# Patient Record
(demographics unavailable — no encounter records)

---

## 2024-11-11 NOTE — BEGINNING OF VISIT
[Parents] : parents [] :  [Other: ______] : provided by [employer] [Time Spent: ____ minutes] : Total time spent using  services: [unfilled] minutes. The patient's primary language is not English thus required  services. [Interpreters_IDNumber] : [id number] [Interpreters_FullName] : rajeev [TWNoteComboBox1] : Com[place]an

## 2024-11-11 NOTE — DISCUSSION/SUMMARY
[Normal Growth] : growth [Normal Development] : developmental [No Elimination Concerns] : elimination [Continue Regimen] : feeding [No Skin Concerns] : skin [Normal Sleep Pattern] : sleep [None] : no known medical problems [Anticipatory Guidance Given] : Anticipatory guidance addressed as per the history of present illness section [ Transition] :  transition [ Care] :  care [Nutritional Adequacy] : nutritional adequacy [Parental Well-Being] : parental well-being [Safety] : safety [No Vaccines] : no vaccines needed [No Medications] : ~He/She~ is not on any medications [Parent/Guardian] : Parent/Guardian [FreeTextEntry1] : 18d ex-35.5wk with 1 episode of SVT on DOL 4 past birth weight, gained 9oz from discharge feeding well  Recommend exclusive breastfeeding, 8-12 feedings per day. Mother should continue prenatal vitamins and avoid alcohol. If formula is needed, recommend iron-fortified formulations every 2-3 hrs. When in car, patient should be in rear-facing car seat in back seat. Air dry umbilical stump. Put baby to sleep on back, in own crib with no loose or soft bedding. Limit baby's exposure to others, especially those with fever or unknown vaccine status. rto for 1mo wcc or sooner prn

## 2024-11-11 NOTE — PHYSICAL EXAM
[Alert] : alert [Normocephalic] : normocephalic [Flat Open Anterior [place]] : flat open anterior fontanelle [PERRL] : PERRL [Red Reflex Bilateral] : red reflex bilateral [Normally Placed Ears] : normally placed ears [Auricles Well Formed] : auricles well formed [Clear Tympanic membranes] : clear tympanic membranes [Light reflex present] : light reflex present [Bony structures visible] : bony structures visible [Patent Auditory Canal] : patent auditory canal [Nares Patent] : nares patent [Palate Intact] : palate intact [Uvula Midline] : uvula midline [Supple, full passive range of motion] : supple, full passive range of motion [Symmetric Chest Rise] : symmetric chest rise [Clear to Auscultation Bilaterally] : clear to auscultation bilaterally [Regular Rate and Rhythm] : regular rate and rhythm [S1, S2 present] : S1, S2 present [+2 Femoral Pulses] : +2 femoral pulses [Soft] : soft [Bowel Sounds] : bowel sounds present [Umbilical Stump Dry, Clean, Intact] : umbilical stump dry, clean, intact [Normal external genitailia] : normal external genitalia [Central Urethral Opening] : central urethral opening [Testicles Descended Bilaterally] : testicles descended bilaterally [Patent] : patent [Normally Placed] : normally placed [No Abnormal Lymph Nodes Palpated] : no abnormal lymph nodes palpated [Symmetric Flexed Extremities] : symmetric flexed extremities [Startle Reflex] : startle reflex present [Suck Reflex] : suck reflex present [Rooting] : rooting reflex present [Palmar Grasp] : palmar grasp present [Plantar Grasp] : plantar reflex present [Symmetric Phillip] : symmetric [place] [Acute Distress] : no acute distress [Icteric sclera] : nonicteric sclera [Discharge] : no discharge [Palpable Masses] : no palpable masses [Murmurs] : no murmurs [Tender] : nontender [Distended] : not distended [Hepatomegaly] : no hepatomegaly [Splenomegaly] : no splenomegaly [Mace-Ortolani] : negative Mace-Ortolani [Spinal Dimple] : no spinal dimple [Tuft of Hair] : no tuft of hair [Jaundice] : not jaundice

## 2024-11-11 NOTE — HISTORY OF PRESENT ILLNESS
[Born at ___ Wks Gestation] : The patient was born at [unfilled] weeks gestation [C/S] : via  section [C/S Indication: ____] : ( [unfilled] ) [Age: ___] : [unfilled] year old mother [G: ___] : G [unfilled] [P: ___] : P [unfilled] [GBS] : GBS positive [Rubella (Immune)] : Rubella immune [(1) _____] : [unfilled] [(5) _____] : [unfilled] [BW: _____] : weight of [unfilled] [DW: _____] : Discharge weight was [unfilled] [HepBsAG] : HepBsAg negative [HIV] : HIV negative [VDRL/RPR (Reactive)] : VDRL/RPR nonreactive [FreeTextEntry1] : 35.2wk repeat C/S at [facility] on CPAP in NICU at [facility] and weaned off DOL 2 S/p amp and gent x 48 hours with blood culture no growth to date.  DOL 4 episode of tachycardia - SVT on EKG which broke without intervention. No further episodes noted. transferred to [facility] normal EKG, no pre-excitation. Has a structurally normal heart by echocardiogram. - f/u cardio 2 weeks after discharge   parents and 1yo sister at home healthy   taking formula (gentlease) 2-2.5oz every 3 hours and a little breastmilk  lots of urine/BM sleeping in bassinet  taking multivitamin   received beyfortus in NICU

## 2024-11-25 NOTE — HISTORY OF PRESENT ILLNESS
[FreeTextEntry1] : 1mo here for well visit hx 35wker, SVT as , s/p NICU stay and cardio eval, has appt with cardio next week  mom concerned with baby acne on face   feeding well - 3-3.5oz every 2-3 hours  normal urine/BM sleeping in bassinet  + tummy time   received hep B at [facility], beyfortus at [facility] NICU

## 2024-11-25 NOTE — PHYSICAL EXAM
[Alert] : alert [Acute Distress] : no acute distress [Normocephalic] : normocephalic [Flat Open Anterior [place]] : flat open anterior fontanelle [PERRL] : PERRL [Red Reflex Bilateral] : red reflex bilateral [Normally Placed Ears] : normally placed ears [Auricles Well Formed] : auricles well formed [Clear Tympanic membranes] : clear tympanic membranes [Light reflex present] : light reflex present [Bony landmarks visible] : bony landmarks visible [Discharge] : no discharge [Nares Patent] : nares patent [Palate Intact] : palate intact [Uvula Midline] : uvula midline [Supple, full passive range of motion] : supple, full passive range of motion [Palpable Masses] : no palpable masses [Symmetric Chest Rise] : symmetric chest rise [Clear to Auscultation Bilaterally] : clear to auscultation bilaterally [Regular Rate and Rhythm] : regular rate and rhythm [S1, S2 present] : S1, S2 present [Murmurs] : no murmurs [+2 Femoral Pulses] : +2 femoral pulses [Soft] : soft [Tender] : nontender [Distended] : not distended [Bowel Sounds] : bowel sounds present [Hepatomegaly] : no hepatomegaly [Splenomegaly] : no splenomegaly [Normal external genitailia] : normal external genitalia [Central Urethral Opening] : central urethral opening [Testicles Descended Bilaterally] : testicles descended bilaterally [Normally Placed] : normally placed [No Abnormal Lymph Nodes Palpated] : no abnormal lymph nodes palpated [Mace-Ortolani] : negative Mace-Ortolani [Symmetric Flexed Extremities] : symmetric flexed extremities [Spinal Dimple] : no spinal dimple [Tuft of Hair] : no tuft of hair [Startle Reflex] : startle reflex present [Suck Reflex] : suck reflex present [Rooting] : rooting reflex present [Palmar Grasp] : palmar grasp reflex present [Plantar Grasp] : plantar grasp reflex present [Symmetric Phillip] : symmetric [place] [Jaundice] : no jaundice [de-identified] : papular rash on face

## 2024-11-25 NOTE — BEGINNING OF VISIT
[Parents] : parents [] :  [Other: ______] : provided by [employer] [Time Spent: ____ minutes] : Total time spent using  services: [unfilled] minutes. The patient's primary language is not English thus required  services. [Interpreters_IDNumber] : [id number] [TWNoteComboBox1] : Burmese

## 2024-11-25 NOTE — DISCUSSION/SUMMARY
[Normal Growth] : growth [Normal Development] : development  [No Elimination Concerns] : elimination [Continue Regimen] : feeding [No Skin Concerns] : skin [Normal Sleep Pattern] : sleep [ Infant] :  infant [None] : no medical problems [Anticipatory Guidance Given] : Anticipatory guidance addressed as per the history of present illness section [Parental Well-Being] : parental well-being [Family Adjustment] : family adjustment [Feeding Routines] : feeding routines [Infant Adjustment] : infant adjustment [Safety] : safety [No Medications] : ~He/She~ is not on any medications [Parent/Guardian] : Parent/Guardian [] : The components of the vaccine(s) to be administered today are listed in the plan of care. The disease(s) for which the vaccine(s) are intended to prevent and the risks have been discussed with the caretaker.  The risks are also included in the appropriate vaccination information statements which have been provided to the patient's caregiver.  The caregiver has given consent to vaccinate. [FreeTextEntry1] : 1 month M here for well visit. No acute concerns for growth or development.  - hep B #2 given today - [place] N[place] negative - ketoconazole for seb derm rash - discussed nutrition, elimination, safe sleep, car safety, avoiding illness - vit D - RTO for 2mo wcc or sooner prn

## 2024-12-30 NOTE — PHYSICAL EXAM
[Alert] : alert [Acute Distress] : no acute distress [Normocephalic] : normocephalic [Flat Open Anterior [place]] : flat open anterior fontanelle [PERRL] : PERRL [Red Reflex Bilateral] : red reflex bilateral [Normally Placed Ears] : normally placed ears [Auricles Well Formed] : auricles well formed [Clear Tympanic membranes] : clear tympanic membranes [Light reflex present] : light reflex present [Bony landmarks visible] : bony landmarks visible [Discharge] : no discharge [Nares Patent] : nares patent [Palate Intact] : palate intact [Uvula Midline] : uvula midline [Supple, full passive range of motion] : supple, full passive range of motion [Palpable Masses] : no palpable masses [Symmetric Chest Rise] : symmetric chest rise [Clear to Auscultation Bilaterally] : clear to auscultation bilaterally [Regular Rate and Rhythm] : regular rate and rhythm [S1, S2 present] : S1, S2 present [Murmurs] : no murmurs [+2 Femoral Pulses] : +2 femoral pulses [Soft] : soft [Tender] : nontender [Distended] : not distended [Bowel Sounds] : bowel sounds present [Hepatomegaly] : no hepatomegaly [Splenomegaly] : no splenomegaly [Normal external genitailia] : normal external genitalia [Central Urethral Opening] : central urethral opening [Testicles Descended Bilaterally] : testicles descended bilaterally [Normally Placed] : normally placed [No Abnormal Lymph Nodes Palpated] : no abnormal lymph nodes palpated [Mace-Ortolani] : negative Mace-Ortolani [Symmetric Flexed Extremities] : symmetric flexed extremities [Spinal Dimple] : no spinal dimple [Tuft of Hair] : no tuft of hair [Startle Reflex] : startle reflex present [Suck Reflex] : suck reflex present [Rooting] : rooting reflex present [Palmar Grasp] : palmar grasp reflex present [Plantar Grasp] : plantar grasp reflex present [Symmetric Phillip] : symmetric [place] [Rash and/or lesion present] : no rash/lesion [FreeTextEntry9] : reducible umbilical hernia [de-identified] : dry skin over ears

## 2024-12-30 NOTE — DISCUSSION/SUMMARY
[Normal Growth] : growth [Normal Development] : development  [No Elimination Concerns] : elimination [Continue Regimen] : feeding [No Skin Concerns] : skin [Normal Sleep Pattern] : sleep [ Infant] :  infant [None] : no medical problems [Anticipatory Guidance Given] : Anticipatory guidance addressed as per the history of present illness section [Parental (Maternal) Well-Being] : parental (maternal) well-being [Infant-Family Synchrony] : infant-family synchrony [Nutritional Adequacy] : nutritional adequacy [Infant Behavior] : infant behavior [Safety] : safety [Age Approp Vaccines] : Age appropriate vaccines administered [No Medications] : ~He/She~ is not on any medications [Parent/Guardian] : Parent/Guardian [] : The components of the vaccine(s) to be administered today are listed in the plan of care. The disease(s) for which the vaccine(s) are intended to prevent and the risks have been discussed with the caretaker.  The risks are also included in the appropriate vaccination information statements which have been provided to the patient's caregiver.  The caregiver has given consent to vaccinate. [FreeTextEntry1] : 2 month M here for well visit. No acute concerns for growth or development.  - pentacel #1, prevnar #1, rota #1 given today  - f/u cardio - discussed umbilical hernia and red flag signs - moisturize dry skin - discussed nutrition, elimination, car safety, safe sleep, milestones - vit d drops - RTO for 4mo wcc or sooner prn

## 2024-12-30 NOTE — BEGINNING OF VISIT
[Mother] : mother [] :  [Language Line ] : provided by Language Line   [Interpreters_IDNumber] : [id number] [Interpreters_FullName] : krish

## 2024-12-30 NOTE — HISTORY OF PRESENT ILLNESS
[FreeTextEntry1] : 2mo here for well visit - hx 35wker with 10 day NICU stay, was on CPAP and had SVT, not discharged on any medications - earlier this month readmitted to hospital with concern for SVT episode - turned red and was foaming from mouth, couldn't breathe. initially at [facility] and transferred to [facility] with normal tele monitoring there, sent home with monitor, has f/u next week  having green stools  also with umbilical hernia  taking 6-8oz gentlease every 3 hours  normal urine/BM sleeping some longer stretches  tummy time smiling, cooing

## 2025-01-06 NOTE — REASON FOR VISIT
[Initial Consultation] : an initial consultation for [Parents] : parents [Interpreters_IDNumber] : [id number] [Interpreters_FullName] : aCrolyne  [TWNoteComboBox1] : [place]

## 2025-01-06 NOTE — CONSULT LETTER
[Today's Date] : [unfilled] [Name] : Name: [unfilled] [] : : ~~ [Today's Date:] : [unfilled] [Dear  ___:] : Dear Dr. [unfilled]: [Consult] : I had the pleasure of evaluating your patient, [unfilled]. My full evaluation follows. [Consult - Single Provider] : Thank you very much for allowing me to participate in the care of this patient. If you have any questions, please do not hesitate to contact me. [Sincerely,] : Sincerely, [FreeTextEntry4] : Dr Claudia Mccabe DO. Claudia Mccabe, DO Claudia Mccabe, DO [FreeTextEntry5] : [address], [place] [address] [FreeTextEntry6] : e: [phone]

## 2025-01-06 NOTE — REVIEW OF SYSTEMS
[___ Formula] : [unfilled] Formula  [___ ounces/feeding] : ~HOOD wang/feeding [___ Times/day] : [unfilled] times/day

## 2025-03-03 NOTE — CARDIOLOGY SUMMARY
[Today's Date] : [unfilled] [FreeTextEntry1] : Normal sinus rhythm at 134 bpm.  Normal axes, intervals and voltages for age. Normal T wave morphology. No ventricular pre-excitation. [FreeTextEntry2] : Summary: 1. Follow up study to assess function. 2.  {S,D,S   } Situs solitus, D-ventricular looping, normally related great arteries. 3. Normal right ventricular morphology with qualitatively normal size and systolic function. 4. Normal left ventricular size, morphology and systolic function. 5. No pericardial effusion. [de-identified] : During the 24 hour Holter (1d7m analysis time) the predominant rhythm was sinus rhythm at an avg rate of 135 (range  bpm). Normal circadian variability was present. No AV block or pauses >2 seconds were present. There were 3 PACs present, no runs of AT or SVT there were rare, isolated, monomorphic PVCs present (73 total, 3/hr). They were not present at peak heart rates, no couplets and no runs of VT were present. No symptomatic events were present.  Summary: Normal 24 hour Holter. [de-identified] : 12/2/24-12/31/24: Multiple tracings showing sinus rhythm and sinus tachycardia with and without isolated PVCs.

## 2025-03-03 NOTE — DISCUSSION/SUMMARY
[FreeTextEntry1] : Lowell is a 3 month old who presents to [facility] for follow-up of a BRUE and possible history of SVT. He was accompanied by his parents.  The etiology of his symptoms seem most consistent with reflux. There is fortunately no evidence of recurrent SVT. I reinforced heart rate teaching with Lowell's parents and they are comfortable checking his heart rates at home. For now we will continue to monitor for arrhythmia off of medications. I will plan on seeing him back in March before I go on leave to check in and review the results of his testing.

## 2025-03-03 NOTE — HISTORY OF PRESENT ILLNESS
[FreeTextEntry1] : Lowell is a 3 month old who presents to [facility] for follow-up of a BRUE and possible history of SVT. He was accompanied by his parents.  He was born at 35 weeks by , he required a brief stay in the NICU for respiratory support. He was thought to have a history of an episode of SVT neonatally (artifact, unclear if it was truly SVT or ST, resolved spontaneously). He was discharged on no medications after heart rate teaching. He was doing well until Dec 1st when his mother noted that he had an episode of turning bright red and was foaming at the mouth while crying. The episode lasted for 10 seconds and he appeared to not take a breath during that 10 second period. He returned immediately to baseline when episode ended, the ending was spontaneous. They have been checking his heart rates at home and were getting ratees no higher than 150 while calm. After the event they did check his heart rate, they didn't count it although they think they could have but did think it was faster than usual. He was otherwise well and did not have any other symptoms, specifically no fever, cough, or congestion.   He does have reflux and spits up often. He takes his feeds well with no tachypnea, sweating, or tiring during feeds. After the event he was brought to the [facility] ED where he was well appearing, ECG was normal. Given his history, however, he was transferred to [facility] [place]. He was monitored for at least 24 hours, telemetry showed normal sinus rhythm with occasional brief sinus bradycardia associated with spitting up. He was ultimately discharged home with an MCOT and cardiology follow-up. There is no significant family history, specifically no history of congenital heart disease, heart transplant or cardiomyopathy, known arrhythmias, ICDs or pacemakers, sudden cardiac death, early MI or stroke, SIDS, frequent miscarriages, death by drowning or single person car accident.

## 2025-03-03 NOTE — CONSULT LETTER
[Today's Date] : [unfilled] [Dear  ___:] : Dear Dr. [unfilled]: [Consult - Single Provider] : Thank you very much for allowing me to participate in the care of this patient. If you have any questions, please do not hesitate to contact me. [Sincerely,] : Sincerely, [Name] : Name: [unfilled] [] : : ~~ [Today's Date:] : [unfilled] [Consult] : I had the pleasure of evaluating your patient, [unfilled]. My full evaluation follows. [FreeTextEntry4] : Dr Claudia Mccabe DO. Claudia Mccabe, DO Claudia Mccabe, DO [FreeTextEntry5] : [address], [place] [address] [FreeTextEntry6] : e: [phone]

## 2025-03-03 NOTE — REASON FOR VISIT
[Parents] : parents [Initial Consultation] : an initial consultation for [FreeTextEntry3] : F/U SVT [Interpreters_IDNumber] : [id number] [Interpreters_FullName] : Carolyne  [TWNoteComboBox1] : Ethiopian

## 2025-03-03 NOTE — REVIEW OF SYSTEMS
[Vomiting] : vomiting [Nl] : no feeding issues at this time. [___ Formula] : [unfilled] Formula  [___ ounces/feeding] : ~HOOD wang/feeding [___ Times/day] : [unfilled] times/day [Acting Fussy] : not acting ~L fussy [Fever] : no fever [Wgt Loss (___ Lbs)] : no recent weight loss [Pallor] : not pale [Discharge] : no discharge [Redness] : no redness [Nasal Discharge] : no nasal discharge [Nasal Stuffiness] : no nasal congestion [Stridor] : no stridor [Cyanosis] : no cyanosis [Edema] : no edema [Diaphoresis] : not diaphoretic [Tachypnea] : not tachypneic [Wheezing] : no wheezing [Cough] : no cough [Being A Poor Eater] : not a poor eater [Diarrhea] : no diarrhea [Decrease In Appetite] : appetite not decreased [Fainting (Syncope)] : no fainting [Dec Consciousness] :  no decrease in consciousness [Seizure] : no seizures [Hypotonicity (Flaccid)] : not hypotonic [Refusal to Bear Wgt] : normal weight bearing [Puffy Hands/Feet] : no hand/feet puffiness [Rash] : no rash [Hemangioma] : no hemangioma [Jaundice] : no jaundice [Wound problems] : no wound problems [Bruising] : no tendency for easy bruising [Swollen Glands] : no lymphadenopathy [Enlarged [place]] : the fontanelle was not enlarged [Hoarse Cry] : no hoarse cry [Failure To Thrive] : no failure to thrive [Penis Circumcised] : not circumcised [Undescended Testes] : no undescended testicle [Ambiguous Genitals] : genitals not ambiguous [Dec Urine Output] : no oliguria

## 2025-03-03 NOTE — PLAN
[TextEntry] : #1 Possible SVT - If you think your child is in SVT, check their heart rate. If >180 while at rest, this is concerning for SVT. Try a vagal maneuver (bearing down, ice to face, assisted headstand). If your child remains in SVT for >30 minutes or if they look unwell at any time, call 911 or bring them to the nearest emergency room. - Hold off on medications for now   #2 PVCs  - Rare, isolated, monomorphic PVCs  - Repeat Holter at 6 months

## 2025-03-12 NOTE — BEGINNING OF VISIT
[Mother] : mother [] :  [Language Line ] : provided by Language Line   [Time Spent: ____ minutes] : Total time spent using  services: [unfilled] minutes. The patient's primary language is not English thus required  services. [Interpreters_IDNumber] : [id number] [TWNoteComboBox1] : Cuban

## 2025-03-12 NOTE — DISCUSSION/SUMMARY
[Normal Growth] : growth [Normal Development] : development  [No Elimination Concerns] : elimination [Continue Regimen] : feeding [Normal Sleep Pattern] : sleep [None] : no medical problems [Anticipatory Guidance Given] : Anticipatory guidance addressed as per the history of present illness section [Family Functioning] : family functioning [Nutritional Adequacy and Growth] : nutritional adequacy and growth [Infant Development] : infant development [Oral Health] : oral health [Safety] : safety [Age Approp Vaccines] : Age appropriate vaccines administered [No Medications] : ~He/She~ is not on any medications [Parent/Guardian] : Parent/Guardian [] : The components of the vaccine(s) to be administered today are listed in the plan of care. The disease(s) for which the vaccine(s) are intended to prevent and the risks have been discussed with the caretaker.  The risks are also included in the appropriate vaccination information statements which have been provided to the patient's caregiver.  The caregiver has given consent to vaccinate. [FreeTextEntry1] : 4 month M here for well visit growing well very dry skin - discussed skin care use hypoallergenic/fragrance free products, vaseline 2-3x a day, short lukewarm baths calmoseptine given for diaper rash, FOBT neg, air exposure, frequent diaper changes can try smaller, more frequent feeds, keep upright for 20-30mins after feeds  - pentacel #2, prevnar #2, rota #2 given today  - discussed nutrition, elimination, safe sleep, car safety - never leave baby unattended on elevated surface will bring back in 2 weeks to reassess skin and discuss solid food introduction

## 2025-03-12 NOTE — REVIEW OF SYSTEMS
[Spitting Up] : spitting up [Rash] : rash [Dry Skin] : dry skin [Itching] : itching [Negative] : Genitourinary

## 2025-03-12 NOTE — PHYSICAL EXAM
[Alert] : alert [Acute Distress] : no acute distress [Normocephalic] : normocephalic [Flat Open Anterior [place]] : flat open anterior fontanelle [Red Reflex] : red reflex bilateral [PERRL] : PERRL [Normally Placed Ears] : normally placed ears [Auricles Well Formed] : auricles well formed [Clear Tympanic membranes] : clear tympanic membranes [Light reflex present] : light reflex present [Bony landmarks visible] : bony landmarks visible [Discharge] : no discharge [Nares Patent] : nares patent [Palate Intact] : palate intact [Uvula Midline] : uvula midline [Palpable Masses] : no palpable masses [Symmetric Chest Rise] : symmetric chest rise [Clear to Auscultation Bilaterally] : clear to auscultation bilaterally [Regular Rate and Rhythm] : regular rate and rhythm [S1, S2 present] : S1, S2 present [Murmurs] : no murmurs [+2 Femoral Pulses] : (+) 2 femoral pulses [Soft] : soft [Tender] : nontender [Distended] : nondistended [Bowel Sounds] : bowel sounds present [Hepatomegaly] : no hepatomegaly [Splenomegaly] : no splenomegaly [Central Urethral Opening] : central urethral opening [Testicles Descended] : testicles descended bilaterally [Patent] : patent [Normally Placed] : normally placed [No Abnormal Lymph Nodes Palpated] : no abnormal lymph nodes palpated [Mace-Ortolani] : negative Mace-Ortolani [Allis Sign] : negative Allis sign [Spinal Dimple] : no spinal dimple [Tuft of Hair] : no tuft of hair [Startle Reflex] : startle reflex present [Plantar Grasp] : plantar grasp reflex present [Symmetric Phillip] : symmetric phillip [de-identified] : erythematous diaper rash with raw skin and bleeding. dry skin all over body with hypopigmentation, scattered eczematous patches

## 2025-03-12 NOTE — HISTORY OF PRESENT ILLNESS
[FreeTextEntry1] : 4mo here for well visit hx  SVT followed by cardiology  has has a rash/dry skin all over body for about 1.5-2 months, itchy not getting any better using aquaphor, using "delicate/sensitive skin" shampoo tide baby laundry detergent  bathes every other day   gentlease 6-8oz every 4 hours  spits up a lot 4-5 BM/day, lots of wet diapers bad diaper rash right now started to roll over putting hands in mouth smiling, laughing

## 2025-03-28 NOTE — DISCUSSION/SUMMARY
[FreeTextEntry1] : 5mo with dry skin/eczema, diaper rash vaseline TID, discussed skin care, if optimizing skin hydration will start steroid ointment air exposure to diaper rash, did FOBT 2 weeks ago and was neg  rto for 6mo wcc or sooner prn

## 2025-03-28 NOTE — PHYSICAL EXAM
[NL] : normotonic [de-identified] : dry skin, eczematous patches scattered all over. ulcerated diaper rash, bleeding

## 2025-03-28 NOTE — HISTORY OF PRESENT ILLNESS
[FreeTextEntry6] : at 4mo wcc complained of rash/dry skin  rash overall much better  using cetaphil bodywash/lotion  free&clear laundry detergent also with diaper rash, worsened after calmoseptine using A&D

## 2025-05-05 NOTE — BEGINNING OF VISIT
[] :  [Language Line ] : provided by [employer] Line   [Mother] : mother [Time Spent: ____ minutes] : Total time spent using  services: [unfilled] minutes. The patient's primary language is not English thus required  services. [Interpreters_IDNumber] : [id number] [TWNoteComboBox1] : [place]

## 2025-05-05 NOTE — PHYSICAL EXAM
[Alert] : alert [Acute Distress] : no acute distress [Normocephalic] : normocephalic [Flat Open Anterior [place]] : flat open anterior fontanelle [Red Reflex] : red reflex bilateral [PERRL] : PERRL [Normally Placed Ears] : normally placed ears [Auricles Well Formed] : auricles well formed [Clear Tympanic membranes] : clear tympanic membranes [Light reflex present] : light reflex present [Bony landmarks visible] : bony landmarks visible [Discharge] : no discharge [Nares Patent] : nares patent [Palate Intact] : palate intact [Uvula Midline] : uvula midline [Tooth Eruption] : no tooth eruption [Supple, full passive range of motion] : supple, full passive range of motion [Palpable Masses] : no palpable masses [Symmetric Chest Rise] : symmetric chest rise [Clear to Auscultation Bilaterally] : clear to auscultation bilaterally [Regular Rate and Rhythm] : regular rate and rhythm [S1, S2 present] : S1, S2 present [Murmurs] : no murmurs [+2 Femoral Pulses] : (+) 2 femoral pulses [Soft] : soft [Tender] : nontender [Distended] : nondistended [Bowel Sounds] : bowel sounds present [Hepatomegaly] : no hepatomegaly [Splenomegaly] : no splenomegaly [Central Urethral Opening] : central urethral opening [Testicles Descended] : testicles descended bilaterally [Patent] : patent [Normally Placed] : normally placed [No Abnormal Lymph Nodes Palpated] : no abnormal lymph nodes palpated [Mace-Ortolani] : negative Mace-Ortolani [Allis Sign] : negative Allis sign [Symmetric Buttocks Creases] : symmetric buttocks creases [Spinal Dimple] : no spinal dimple [Tuft of Hair] : no tuft of hair [Plantar Grasp] : plantar grasp reflex present [Cranial Nerves Grossly Intact] : cranial nerves grossly intact [de-identified] : low tone  [de-identified] : very dry skin, eczematous patches some excoriations

## 2025-05-05 NOTE — HISTORY OF PRESENT ILLNESS
[FreeTextEntry1] : 6mo here for well visit hx  SVT - follows with cardio, holter  not concerning  hx eczema  feeding well, ~24-30oz formula a day  started baby food, doing well  normal urine/BM sleeping well  using cerave and natural/herbal ointment - skin looks better per mom  sits with support rolling over reaching, grasping

## 2025-05-05 NOTE — DISCUSSION/SUMMARY
[Normal Growth] : growth [Normal Development] : development [None] : No medical problems [No Elimination Concerns] : elimination [No Feeding Concerns] : feeding [No Skin Concerns] : skin [Normal Sleep Pattern] : sleep [Family Functioning] : family functioning [Nutrition and Feeding] : nutrition and feeding [Infant Development] : infant development [Oral Health] : oral health [Safety] : safety [No Medications] : ~He/She~ is not on any medications [Parent/Guardian] : parent/guardian [] : The components of the vaccine(s) to be administered today are listed in the plan of care. The disease(s) for which the vaccine(s) are intended to prevent and the risks have been discussed with the caretaker.  The risks are also included in the appropriate vaccination information statements which have been provided to the patient's caregiver.  The caregiver has given consent to vaccinate. [FreeTextEntry1] : 6 month M here for well visit growing well, slightly low tone - slipping through my hands and sitting only with support - born at 35 weeks will reassess, encourage tummy time and sitting with support eczema better but still very dry and irritated skin, scratching throughout appt. will send steroid ointment and reassess - pentacel #3, prevnar #3, rota #3 given today  - discussed solid foods, adding water / sippy cup, brushing teeth, milestones, sleep - RTO for 9mo wcc or sooner prn

## 2025-07-28 NOTE — PHYSICAL EXAM
[Alert] : alert [Acute Distress] : no acute distress [Normocephalic] : normocephalic [Flat Open Anterior [place]] : flat open anterior fontanelle [Red Reflex] : red reflex bilateral [Excessive Tearing] : no excessive tearing [PERRL] : PERRL [Normally Placed Ears] : normally placed ears [Auricles Well Formed] : auricles well formed [Clear Tympanic membranes] : clear tympanic membranes [Light reflex present] : light reflex present [Bony landmarks visible] : bony landmarks visible [Discharge] : no discharge [Nares Patent] : nares patent [Palate Intact] : palate intact [Uvula Midline] : uvula midline [Supple, full passive range of motion] : supple, full passive range of motion [Palpable Masses] : no palpable masses [Symmetric Chest Rise] : symmetric chest rise [Clear to Auscultation Bilaterally] : clear to auscultation bilaterally [Regular Rate and Rhythm] : regular rate and rhythm [S1, S2 present] : S1, S2 present [Murmurs] : no murmurs [+2 Femoral Pulses] : (+) 2 femoral pulses [Soft] : soft [Tender] : nontender [Distended] : nondistended [Bowel Sounds] : bowel sounds present [Hepatomegaly] : no hepatomegaly [Splenomegaly] : no splenomegaly [Central Urethral Opening] : central urethral opening [Testicles Descended] : testicles descended bilaterally [No Abnormal Lymph Nodes Palpated] : no abnormal lymph nodes palpated [Symmetric Abduction and Rotation of hips] : symmetric abduction and rotation of hips [Allis Sign] : negative Allis sign [Straight] : straight [Cranial Nerves Grossly Intact] : cranial nerves grossly intact [de-identified] : scattered eczematous patches on arms, trunk, face. b/l legs erythematous and very dry, cracked skin, eczematous patches

## 2025-07-28 NOTE — HISTORY OF PRESENT ILLNESS
[FreeTextEntry1] : 9mo here for well visit hx eczema - flaring again for past 3 days  changed laundry detergent - unscented  steroid ointment not working anymore foot bad now and not healing   eating well normal urine/BM sleeps through the night brushing teeth, PVF  pulls to stand crawling says elaine stephenson grasp

## 2025-07-28 NOTE — BEGINNING OF VISIT
[] :  [Language Line ] : provided by Language Line   [Time Spent: ____ minutes] : Total time spent using  services: [unfilled] minutes. The patient's primary language is not English thus required  services. [Interpreters_IDNumber] : [id number] [TWNoteComboBox1] : [place]ian

## 2025-07-28 NOTE — DISCUSSION/SUMMARY
[Normal Growth] : growth [Normal Development] : development [None] : No known medical problems [No Elimination Concerns] : elimination [No Feeding Concerns] : feeding [Normal Sleep Pattern] : sleep [Family Adaptation] : family adaptation [Infant [place]] : infant independence [Feeding Routine] : feeding routine [Safety] : safety [No Medications] : ~He/She~ is not on any medications [Parent/Guardian] : parent/guardian [] : The components of the vaccine(s) to be administered today are listed in the plan of care. The disease(s) for which the vaccine(s) are intended to prevent and the risks have been discussed with the caretaker.  The risks are also included in the appropriate vaccination information statements which have been provided to the patient's caregiver.  The caregiver has given consent to vaccinate. [FreeTextEntry1] : 9 month M here for well visit. No acute concerns for growth or development. - hep B given - derm for eczema - discussed again cerave/cetaphil, keeping skin moisturized with fragrance free lotion, unscented detergent - sent higher potency steroid ointment for body - Continue breastmilk or formula as desired. Increase table foods, 3 meals with 2-3 snacks per day. Incorporate up to 6 oz of fluorinated water daily in a sippy cup. Discussed weaning of bottle and pacifier. Wipe teeth daily with washcloth. When in car, patient should be in rear-facing car seat in back seat. Put baby to sleep in own crib with no loose or soft bedding. Lower crib mattress. Help baby to maintain consistent daily routines and sleep schedule. Recognize stranger anxiety. Ensure home is safe since baby is increasingly mobile. Be within arm's reach of baby at all times. Use consistent, positive discipline. Avoid screen time. Read aloud to baby. - brush teeth, fluoride vitamin sent - RTO for 2yo wcc or sooner prn